# Patient Record
Sex: FEMALE | NOT HISPANIC OR LATINO
[De-identification: names, ages, dates, MRNs, and addresses within clinical notes are randomized per-mention and may not be internally consistent; named-entity substitution may affect disease eponyms.]

---

## 2022-10-12 ENCOUNTER — NON-APPOINTMENT (OUTPATIENT)
Age: 32
End: 2022-10-12

## 2022-10-13 PROBLEM — Z00.00 ENCOUNTER FOR PREVENTIVE HEALTH EXAMINATION: Status: ACTIVE | Noted: 2022-10-13

## 2022-10-14 ENCOUNTER — APPOINTMENT (OUTPATIENT)
Dept: PULMONOLOGY | Facility: CLINIC | Age: 32
End: 2022-10-14

## 2022-10-14 VITALS
OXYGEN SATURATION: 97 % | BODY MASS INDEX: 45.14 KG/M2 | DIASTOLIC BLOOD PRESSURE: 82 MMHG | HEART RATE: 103 BPM | WEIGHT: 291 LBS | HEIGHT: 67.5 IN | TEMPERATURE: 97.4 F | SYSTOLIC BLOOD PRESSURE: 129 MMHG

## 2022-10-14 DIAGNOSIS — Z87.891 PERSONAL HISTORY OF NICOTINE DEPENDENCE: ICD-10-CM

## 2022-10-14 DIAGNOSIS — Z87.39 PERSONAL HISTORY OF OTHER DISEASES OF THE MUSCULOSKELETAL SYSTEM AND CONNECTIVE TISSUE: ICD-10-CM

## 2022-10-14 PROCEDURE — 99204 OFFICE O/P NEW MOD 45 MIN: CPT

## 2022-10-14 RX ORDER — MELOXICAM 10 MG/1
CAPSULE ORAL
Refills: 0 | Status: ACTIVE | COMMUNITY

## 2022-10-14 RX ORDER — CYCLOBENZAPRINE HYDROCHLORIDE 7.5 MG/1
TABLET, FILM COATED ORAL
Refills: 0 | Status: ACTIVE | COMMUNITY

## 2022-10-14 NOTE — PHYSICAL EXAM
[General Appearance - Well Developed] : well developed [Normal Appearance] : normal appearance [Well Groomed] : well groomed [General Appearance - Well Nourished] : well nourished [No Deformities] : no deformities [General Appearance - In No Acute Distress] : no acute distress [FreeTextEntry1] : BMI 45 [Normal Conjunctiva] : the conjunctiva exhibited no abnormalities [Eyelids - No Xanthelasma] : the eyelids demonstrated no xanthelasmas [Enlarged Base of the Tongue] : no enlargement of the base of the tongue [Erythema] : no erythema of the pharynx [II] : II [Neck Appearance] : the appearance of the neck was normal [Neck Cervical Mass (___cm)] : no neck mass was observed [Jugular Venous Distention Increased] : there was no jugular-venous distention [Thyroid Diffuse Enlargement] : the thyroid was not enlarged [Thyroid Nodule] : there were no palpable thyroid nodules [Heart Rate And Rhythm] : heart rate was normal and rhythm regular [Heart Sounds] : normal S1 and S2 [Heart Sounds Gallop] : no gallops [Murmurs] : no murmurs [Heart Sounds Pericardial Friction Rub] : no pericardial rub [Auscultation Breath Sounds / Voice Sounds] : lungs were clear to auscultation bilaterally [Abnormal Walk] : normal gait [Musculoskeletal - Swelling] : no joint swelling seen [Motor Tone] : muscle strength and tone were normal [Nail Clubbing] : no clubbing of the fingernails [Cyanosis, Localized] : no localized cyanosis [Petechial Hemorrhages (___cm)] : no petechial hemorrhages [] : no ischemic changes [FreeTextEntry2] : No edema [Sensation] : the sensory exam was normal to light touch and pinprick [No Focal Deficits] : no focal deficits [Oriented To Time, Place, And Person] : oriented to person, place, and time [Impaired Insight] : insight and judgment were intact [Affect] : the affect was normal

## 2022-10-14 NOTE — ASSESSMENT
[FreeTextEntry1] : Severe snoring, witnessed apnea, significant daytime sleepiness, morbid obesity\par \par Obstructive sleep apnea is very likely.  Her sleep is also disrupted by working evenings with a somewhat irregular sleep schedule.  She tells me her weight has fluctuated down and then up over the past 10 years.  Her tonsils are quite enlarged, and if she has significant apnea this might be worth addressing.  I will see her again after she has unattended sleep testing.

## 2022-10-14 NOTE — HISTORY OF PRESENT ILLNESS
[FreeTextEntry1] : 10/14/2022: Initial visit for this very pleasant 32-year-old woman who comes in for possible sleep apnea.  She works evening shifts as a  at the hospital, 4 PM to midnight.  She generally goes to bed about 5 AM and sleeps until 2:45 PM, with several awakenings during that time.  She has significant daytime sleepiness, with an [place] sleepiness score of 17.  She has been told of severe snoring and has been noted to stop breathing while asleep.  There is no history of parasomnia, cataplexy, sleep paralysis, or cataplexy.  She does have a feeling of restlessness in her legs as she is falling asleep.\par \par She is recently been taking nonsteroidal anti-inflammatory drugs and muscle relaxant for low back pain.  She tells me she recently had a full physical and laboratory evaluation but has not seen the results.

## 2022-11-19 ENCOUNTER — OUTPATIENT (OUTPATIENT)
Dept: OUTPATIENT SERVICES | Facility: HOSPITAL | Age: 32
LOS: 1 days | End: 2022-11-19
Payer: COMMERCIAL

## 2022-11-19 ENCOUNTER — APPOINTMENT (OUTPATIENT)
Dept: SLEEP CENTER | Facility: HOME HEALTH | Age: 32
End: 2022-11-19

## 2022-11-19 PROCEDURE — 95800 SLP STDY UNATTENDED: CPT

## 2022-11-19 PROCEDURE — 95800 SLP STDY UNATTENDED: CPT | Mod: 26

## 2022-11-21 ENCOUNTER — TRANSCRIPTION ENCOUNTER (OUTPATIENT)
Age: 32
End: 2022-11-21

## 2022-11-21 DIAGNOSIS — G47.33 OBSTRUCTIVE SLEEP APNEA (ADULT) (PEDIATRIC): ICD-10-CM

## 2022-12-05 ENCOUNTER — APPOINTMENT (OUTPATIENT)
Dept: PULMONOLOGY | Facility: CLINIC | Age: 32
End: 2022-12-05

## 2022-12-05 PROCEDURE — 99212 OFFICE O/P EST SF 10 MIN: CPT | Mod: 95

## 2022-12-05 NOTE — REVIEW OF SYSTEMS
[facility] residence [Fatigue] : fatigue [Recent Wt Loss (___ Lbs)] : ~T recent [unfilled] lb weight loss [Negative] : Neurologic

## 2022-12-07 DIAGNOSIS — G47.33 OBSTRUCTIVE SLEEP APNEA (ADULT) (PEDIATRIC): ICD-10-CM

## 2022-12-07 DIAGNOSIS — G47.30 SLEEP APNEA, UNSPECIFIED: ICD-10-CM

## 2022-12-07 DIAGNOSIS — Z99.89 OBSTRUCTIVE SLEEP APNEA (ADULT) (PEDIATRIC): ICD-10-CM

## 2023-04-11 ENCOUNTER — FORM ENCOUNTER (OUTPATIENT)
Age: 33
End: 2023-04-11

## 2023-04-20 NOTE — HISTORY OF PRESENT ILLNESS
[Home] : at home, [unfilled] , at the time of the visit. [Medical Office: ([facility])___] : at the medical office located in  [Verbal consent obtained from patient] : the patient, [unfilled] [TextBox_4] : 12/05/2022 :  CORY TAFOYA is a 32 year old female with PMHx obesity who is here for follow up after HST.\par \par Her test shows: moderate to sever sleep apnea with meri oxygen saturation of 76% and less than 1% of study time spend below an oxygen saturation of 88%. \par She informed me has lost 10lb since the last visit. \par \par Otherwise, denies any new complains or diagnosis since the last visit. \par \par

## 2023-04-20 NOTE — ASSESSMENT
[FreeTextEntry1] : 31 y/o F with moderate to sever sleep apnea who  is having telehealth for sleep study.